# Patient Record
Sex: MALE | Race: WHITE | NOT HISPANIC OR LATINO | URBAN - METROPOLITAN AREA
[De-identification: names, ages, dates, MRNs, and addresses within clinical notes are randomized per-mention and may not be internally consistent; named-entity substitution may affect disease eponyms.]

---

## 2020-05-01 NOTE — PATIENT DISCUSSION
The risks, benefits and alternatives of refractive surgery have been discussed to include possible decrease in vision, dry eye, and halos/starbursts around lights.

## 2020-05-01 NOTE — PATIENT DISCUSSION
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 Activities include: driving at night and near tasks that require good depth perception.

## 2020-05-01 NOTE — PATIENT DISCUSSION
MYOPIA/ASTIGMATISM, OU - DISC WITH PT THE NEED TO EXCESSIVELY LUBRICATE UNTIL MONDAY TO COME BACK IN AND BE RE-MANIFESTED AND CYCLO.

## 2020-05-01 NOTE — PATIENT DISCUSSION
It was discussed and explained that in the event topographical mapping indicates lenticular astigmatism in either eye(s) by having LASIK / PRK-ASA corneal astigmatism may/will be induced and will have to be addressed at the time of cataract surgery when that time comes.

## 2020-05-01 NOTE — PATIENT DISCUSSION
It was discussed that after refractive surgery, a certain number of patients experience glare, a ?star bursting? or halo effect around lights, or other low-light vision problems that may interfere with the ability to drive at night or see well in dim light. Although there are several possible causes for these difficulties to include pupils of any size, the risk may be increased in patients with large pupils or high degrees of correction. For most patients this is a temporary condition that diminishes with time or is correctable by wearing glasses at night or taking eye drops. For some patients, however, these visual problems are permanent.

## 2022-08-02 ENCOUNTER — ESTABLISHED PATIENT (OUTPATIENT)
Dept: URBAN - METROPOLITAN AREA CLINIC 20 | Facility: CLINIC | Age: 72
End: 2022-08-02

## 2022-08-02 DIAGNOSIS — H35.3122: ICD-10-CM

## 2022-08-02 DIAGNOSIS — H35.3213: ICD-10-CM

## 2022-08-02 DIAGNOSIS — H35.412: ICD-10-CM

## 2022-08-02 DIAGNOSIS — H52.4: ICD-10-CM

## 2022-08-02 DIAGNOSIS — H25.813: ICD-10-CM

## 2022-08-02 PROCEDURE — 92134 CPTRZ OPH DX IMG PST SGM RTA: CPT

## 2022-08-02 PROCEDURE — 92014 COMPRE OPH EXAM EST PT 1/>: CPT

## 2022-08-02 PROCEDURE — 92015 DETERMINE REFRACTIVE STATE: CPT

## 2022-08-02 ASSESSMENT — VISUAL ACUITY
OU_SC: J1-2
OS_CC: 20/30+1
OU_CC: 20/25-3

## 2022-08-02 ASSESSMENT — KERATOMETRY
OS_AXISANGLE_DEGREES: 55
OS_AXISANGLE2_DEGREES: 145
OD_K1POWER_DIOPTERS: 42.25
OS_K1POWER_DIOPTERS: 42.25
OS_K2POWER_DIOPTERS: 42.75
OD_AXISANGLE2_DEGREES: 11
OD_AXISANGLE_DEGREES: 101
OD_K2POWER_DIOPTERS: 43.25

## 2022-08-02 ASSESSMENT — TONOMETRY
OD_IOP_MMHG: 10
OS_IOP_MMHG: 21